# Patient Record
Sex: MALE | Race: WHITE | Employment: OTHER | URBAN - METROPOLITAN AREA
[De-identification: names, ages, dates, MRNs, and addresses within clinical notes are randomized per-mention and may not be internally consistent; named-entity substitution may affect disease eponyms.]

---

## 2020-01-19 ENCOUNTER — OFFICE VISIT (OUTPATIENT)
Dept: FAMILY MEDICINE CLINIC | Age: 65
End: 2020-01-19
Payer: MEDICARE

## 2020-01-19 VITALS
DIASTOLIC BLOOD PRESSURE: 84 MMHG | OXYGEN SATURATION: 93 % | TEMPERATURE: 98.4 F | SYSTOLIC BLOOD PRESSURE: 162 MMHG | BODY MASS INDEX: 33.72 KG/M2 | WEIGHT: 249 LBS | HEIGHT: 72 IN | RESPIRATION RATE: 16 BRPM | HEART RATE: 73 BPM

## 2020-01-19 PROCEDURE — 99204 OFFICE O/P NEW MOD 45 MIN: CPT | Performed by: NURSE PRACTITIONER

## 2020-01-19 RX ORDER — ACYCLOVIR 50 MG/G
OINTMENT TOPICAL
Qty: 1 TUBE | Refills: 1 | Status: SHIPPED | OUTPATIENT
Start: 2020-01-19 | End: 2020-01-26

## 2020-01-19 RX ORDER — ACYCLOVIR 800 MG/1
800 TABLET ORAL
Qty: 35 TABLET | Refills: 0 | Status: SHIPPED | OUTPATIENT
Start: 2020-01-19 | End: 2020-01-26

## 2020-01-19 ASSESSMENT — ENCOUNTER SYMPTOMS
VOMITING: 0
SHORTNESS OF BREATH: 0
COUGH: 0

## 2020-01-19 ASSESSMENT — PATIENT HEALTH QUESTIONNAIRE - PHQ9
SUM OF ALL RESPONSES TO PHQ QUESTIONS 1-9: 0
SUM OF ALL RESPONSES TO PHQ QUESTIONS 1-9: 0
1. LITTLE INTEREST OR PLEASURE IN DOING THINGS: 0
SUM OF ALL RESPONSES TO PHQ9 QUESTIONS 1 & 2: 0
2. FEELING DOWN, DEPRESSED OR HOPELESS: 0

## 2020-01-19 NOTE — PROGRESS NOTES
[address] [facility]  [address] [facility] [address] [address]  Dept: [phone]  Dept Fax: [phone]    Jaylan Mosquera is a 75 y.o. male who presents to the urgent care today for his medical conditions/complaints as notedbelow. Jaylan Mosquera is c/o of Herpes Zoster (x 1 week roughly, rash and pain center of abdomen that radiates on the side to the center of the back)      HPI:     78-year-old male patient presents with complaints of tender rash to right side. States noticed about a week ago that the skin seemed a little tender. No history of injury. And then in the last 48 hours has broken out into a rash with some clear fluid-filled vesicles starting to present. He denies fevers or chills and states feels well. He does have a history of chickenpox when younger. Rash is not itchy. Rash becoming more tender with time. No history of any medical problems  No PCP but does plan to establish himself with Dr. Robin Barros. Rash   This is a new problem. Episode onset: <48 hrs. The problem has been rapidly worsening since onset. The affected locations include the torso. The rash is characterized by blistering, pain and redness. He was exposed to nothing. Pertinent negatives include no cough, facial edema, fatigue, fever, joint pain, shortness of breath or vomiting. Past treatments include nothing. The treatment provided no relief. His past medical history is significant for varicella. History reviewed. No pertinent past medical history. Current Outpatient Medications   Medication Sig Dispense Refill    acyclovir (ZOVIRAX) 800 MG tablet Take 1 tablet by mouth 5 times daily for 7 days 35 tablet 0    acyclovir (ZOVIRAX) 5 % ointment Apply topically 5 times daily. 1 Tube 1     No current facility-administered medications for this visit. No Known Allergies    Subjective:      Review of Systems   Constitutional: Negative for fatigue and fever. Status: He is alert and oriented to person, place, and time. Psychiatric:         Mood and Affect: Mood normal.         Behavior: Behavior normal.       BP (!) 162/84 (Site: Right Upper Arm)   Pulse 73   Temp 98.4 °F (36.9 °C)   Resp 16   Ht 6' (1.829 m)   Wt 249 lb (112.9 kg)   SpO2 93%   BMI 33.77 kg/m²     Assessment:       Diagnosis Orders   1. Herpes zoster without complication     2. Elevated blood pressure reading         Plan:   Based on patient's symptoms and clinical exam findings I feel this is shingles rash  Educated patient on shingles and how it is spread  Patient is immunocompetent  Acyclovir prescription  Zovirax ointment rx per pt additional request, though told pt really needs the oral tx for this size rash. Blood pressure elevated here, does have access to blood pressure cuff at home  Patient to take blood pressure and heart rate and record at least daily and take to office. He plans to establish himself with Dr. Halle Rincon  Discussed htn as silent killer, end organ damage  Smoking cessation  Dash diet  No caffeine, limit etoh, DASH diet  Pt verbalizes agreement and understanding  Return for Make an Appt. with your Primary Care in 1 week. Orders Placed This Encounter   Medications    acyclovir (ZOVIRAX) 800 MG tablet     Sig: Take 1 tablet by mouth 5 times daily for 7 days     Dispense:  35 tablet     Refill:  0    acyclovir (ZOVIRAX) 5 % ointment     Sig: Apply topically 5 times daily. Dispense:  1 Tube     Refill:  1         Patient given educational materials - see patient instructions. Discussed use, benefit, and side effects of prescribed medications. All patient questions answered. Pt voicedunderstanding.     Electronically signed by JOHNSON Proctor CNP on 1/19/2020 at 1:54 PMacy

## 2020-01-19 NOTE — PATIENT INSTRUCTIONS
Patient Education        Shingles: Care Instructions  Your Care Instructions    Shingles (herpes zoster) causes pain and a blistered rash. The rash can appear anywhere on the body but will be on only one side of the body, the left or right. It will be in a band, a strip, or a small area. The pain can be very severe. Shingles can also cause tingling or itching in the area of the rash. The blisters scab over after a few days and heal in 2 to 4 weeks. Medicines can help you feel better and may help prevent more serious problems caused by shingles. Shingles is caused by the same virus that causes chickenpox. When you have chickenpox, the virus gets into your nerve roots and stays there (becomes dormant) long after you get over the chickenpox. If the virus becomes active again, it can cause shingles. Follow-up care is a key part of your treatment and safety. Be sure to make and go to all appointments, and call your doctor if you are having problems. It's also a good idea to know your test results and keep a list of the medicines you take. How can you care for yourself at home? · Be safe with medicines. Take your medicines exactly as prescribed. Call your doctor if you think you are having a problem with your medicine. Antiviral medicine helps you get better faster. · Try not to scratch or pick at the blisters. They will crust over and fall off on their own if you leave them alone. · Put cool, wet cloths on the area to relieve pain and itching. You can also use calamine lotion. Try not to use so much lotion that it cakes and is hard to get off. · Put cornstarch or baking soda on the sores to help dry them out so they heal faster. · Do not use thick ointment, such as petroleum jelly, on the sores. This will keep them from drying and healing. · To help remove loose crusts, soak them in tap water. This can help decrease oozing, and dry and soothe the skin.   · Take an over-the-counter pain medicine, such as [email or web address] and sign in to your [employer] account. Enter K391 in the [place] box to learn more about \"DASH Diet: Care Instructions. \"     If you do not have an account, please click on the \"Sign Up Now\" link. Current as of: April 9, 2019  Content Version: 12.3  © 1994-2941 [employer]. Care instructions adapted under license by [facility] ([facility]). If you have questions about a medical condition or this instruction, always ask your healthcare professional. [address] any warranty or liability for your use of this information. Patient Education        Elevated Blood Pressure: Care Instructions  Your Care Instructions    Blood pressure is a measure of how hard the blood pushes against the walls of your arteries. It's normal for blood pressure to go up and down throughout the day. But if it stays up over time, you have high blood pressure. Two numbers tell you your blood pressure. The first number is the systolic pressure. It shows how hard the blood pushes when your heart is pumping. The second number is the diastolic pressure. It shows how hard the blood pushes between heartbeats, when your heart is relaxed and filling with blood. An ideal blood pressure in adults is less than 120/80 (say \"120 over 80\"). High blood pressure is 140/90 or higher. You have high blood pressure if your top number is 140 or higher or your bottom number is 90 or higher, or both. The main test for high blood pressure is simple, fast, and painless. To diagnose high blood pressure, your doctor will test your blood pressure at different times. After testing your blood pressure, your doctor may ask you to test it again when you are home. If you are diagnosed with high blood pressure, you can work with your doctor to make a long-term plan to manage it. Follow-up care is a key part of your treatment and safety.  Be sure to make and go to all appointments, and call